# Patient Record
Sex: MALE | Race: WHITE | Employment: OTHER | URBAN - METROPOLITAN AREA
[De-identification: names, ages, dates, MRNs, and addresses within clinical notes are randomized per-mention and may not be internally consistent; named-entity substitution may affect disease eponyms.]

---

## 2019-02-28 ENCOUNTER — TRANSFERRED RECORDS (OUTPATIENT)
Dept: HEALTH INFORMATION MANAGEMENT | Facility: CLINIC | Age: 69
End: 2019-02-28

## 2019-04-08 ENCOUNTER — MEDICAL CORRESPONDENCE (OUTPATIENT)
Dept: HEALTH INFORMATION MANAGEMENT | Facility: CLINIC | Age: 69
End: 2019-04-08

## 2019-04-16 ENCOUNTER — TRANSFERRED RECORDS (OUTPATIENT)
Dept: HEALTH INFORMATION MANAGEMENT | Facility: CLINIC | Age: 69
End: 2019-04-16

## 2019-04-17 ENCOUNTER — APPOINTMENT (OUTPATIENT)
Dept: GENERAL RADIOLOGY | Facility: CLINIC | Age: 69
DRG: 193 | End: 2019-04-17
Attending: NURSE PRACTITIONER
Payer: COMMERCIAL

## 2019-04-17 ENCOUNTER — HOSPITAL ENCOUNTER (INPATIENT)
Facility: CLINIC | Age: 69
LOS: 1 days | Discharge: ANOTHER HOSPITAL | DRG: 193 | End: 2019-04-18
Attending: INTERNAL MEDICINE | Admitting: INTERNAL MEDICINE
Payer: COMMERCIAL

## 2019-04-17 PROBLEM — J96.00 ACUTE RESPIRATORY FAILURE (H): Status: ACTIVE | Noted: 2019-04-17

## 2019-04-17 LAB
ANION GAP SERPL CALCULATED.3IONS-SCNC: 6 MMOL/L (ref 3–14)
BASE EXCESS BLDA CALC-SCNC: 4.6 MMOL/L
BUN SERPL-MCNC: 27 MG/DL (ref 7–30)
CALCIUM SERPL-MCNC: 8.3 MG/DL (ref 8.5–10.1)
CHLORIDE SERPL-SCNC: 106 MMOL/L (ref 94–109)
CO2 SERPL-SCNC: 27 MMOL/L (ref 20–32)
CREAT SERPL-MCNC: 0.51 MG/DL (ref 0.66–1.25)
ERYTHROCYTE [DISTWIDTH] IN BLOOD BY AUTOMATED COUNT: 20.7 % (ref 10–15)
GFR SERPL CREATININE-BSD FRML MDRD: >90 ML/MIN/{1.73_M2}
GLUCOSE BLDC GLUCOMTR-MCNC: 117 MG/DL (ref 70–99)
GLUCOSE BLDC GLUCOMTR-MCNC: 118 MG/DL (ref 70–99)
GLUCOSE BLDC GLUCOMTR-MCNC: 122 MG/DL (ref 70–99)
GLUCOSE BLDC GLUCOMTR-MCNC: 94 MG/DL (ref 70–99)
GLUCOSE SERPL-MCNC: 131 MG/DL (ref 70–99)
HBA1C MFR BLD: 7.4 % (ref 0–5.6)
HCO3 BLD-SCNC: 30 MMOL/L (ref 21–28)
HCT VFR BLD AUTO: 30.6 % (ref 40–53)
HGB BLD-MCNC: 9 G/DL (ref 13.3–17.7)
INR PPP: 3.02 (ref 0.86–1.14)
LACTATE BLD-SCNC: 1 MMOL/L (ref 0.7–2)
MCH RBC QN AUTO: 21.4 PG (ref 26.5–33)
MCHC RBC AUTO-ENTMCNC: 29.4 G/DL (ref 31.5–36.5)
MCV RBC AUTO: 73 FL (ref 78–100)
O2/TOTAL GAS SETTING VFR VENT: 30 %
OXYHGB MFR BLD: 89 % (ref 92–100)
PCO2 BLD: 50 MM HG (ref 35–45)
PH BLD: 7.39 PH (ref 7.35–7.45)
PLATELET # BLD AUTO: 258 10E9/L (ref 150–450)
PO2 BLD: 62 MM HG (ref 80–105)
POTASSIUM SERPL-SCNC: 3.8 MMOL/L (ref 3.4–5.3)
RBC # BLD AUTO: 4.21 10E12/L (ref 4.4–5.9)
SODIUM SERPL-SCNC: 139 MMOL/L (ref 133–144)
WBC # BLD AUTO: 8.3 10E9/L (ref 4–11)

## 2019-04-17 PROCEDURE — G0463 HOSPITAL OUTPT CLINIC VISIT: HCPCS

## 2019-04-17 PROCEDURE — 94660 CPAP INITIATION&MGMT: CPT

## 2019-04-17 PROCEDURE — 00000146 ZZHCL STATISTIC GLUCOSE BY METER IP

## 2019-04-17 PROCEDURE — 94640 AIRWAY INHALATION TREATMENT: CPT

## 2019-04-17 PROCEDURE — 25000128 H RX IP 250 OP 636: Performed by: INTERNAL MEDICINE

## 2019-04-17 PROCEDURE — 25800030 ZZH RX IP 258 OP 636: Performed by: INTERNAL MEDICINE

## 2019-04-17 PROCEDURE — 40000275 ZZH STATISTIC RCP TIME EA 10 MIN

## 2019-04-17 PROCEDURE — 99207 ZZC CDG-HISTORY COMP: MEETS EXP. PROBLEM FOCUSED-DOWN CODED LACK OF ROS: CPT | Performed by: INTERNAL MEDICINE

## 2019-04-17 PROCEDURE — 99221 1ST HOSP IP/OBS SF/LOW 40: CPT | Mod: AI | Performed by: INTERNAL MEDICINE

## 2019-04-17 PROCEDURE — 25000132 ZZH RX MED GY IP 250 OP 250 PS 637: Performed by: INTERNAL MEDICINE

## 2019-04-17 PROCEDURE — 36415 COLL VENOUS BLD VENIPUNCTURE: CPT | Performed by: INTERNAL MEDICINE

## 2019-04-17 PROCEDURE — 85610 PROTHROMBIN TIME: CPT | Performed by: INTERNAL MEDICINE

## 2019-04-17 PROCEDURE — 71045 X-RAY EXAM CHEST 1 VIEW: CPT

## 2019-04-17 PROCEDURE — 82805 BLOOD GASES W/O2 SATURATION: CPT | Performed by: INTERNAL MEDICINE

## 2019-04-17 PROCEDURE — 80048 BASIC METABOLIC PNL TOTAL CA: CPT | Performed by: INTERNAL MEDICINE

## 2019-04-17 PROCEDURE — 27210437 ZZH NUTRITION PRODUCT SEMIELEM INTERMED LITER

## 2019-04-17 PROCEDURE — 25000125 ZZHC RX 250: Performed by: INTERNAL MEDICINE

## 2019-04-17 PROCEDURE — 83605 ASSAY OF LACTIC ACID: CPT | Performed by: INTERNAL MEDICINE

## 2019-04-17 PROCEDURE — 83036 HEMOGLOBIN GLYCOSYLATED A1C: CPT | Performed by: INTERNAL MEDICINE

## 2019-04-17 PROCEDURE — 85027 COMPLETE CBC AUTOMATED: CPT | Performed by: INTERNAL MEDICINE

## 2019-04-17 PROCEDURE — 21000001 ZZH R&B HEART CARE

## 2019-04-17 PROCEDURE — 94640 AIRWAY INHALATION TREATMENT: CPT | Mod: 76

## 2019-04-17 PROCEDURE — 25000131 ZZH RX MED GY IP 250 OP 636 PS 637: Performed by: INTERNAL MEDICINE

## 2019-04-17 PROCEDURE — 25000132 ZZH RX MED GY IP 250 OP 250 PS 637

## 2019-04-17 RX ORDER — ALBUTEROL SULFATE 90 UG/1
2 AEROSOL, METERED RESPIRATORY (INHALATION) EVERY 4 HOURS PRN
COMMUNITY

## 2019-04-17 RX ORDER — AMOXICILLIN 500 MG/1
500 CAPSULE ORAL 2 TIMES DAILY
Status: ON HOLD | COMMUNITY
End: 2019-04-18

## 2019-04-17 RX ORDER — BUMETANIDE 2 MG/1
2 TABLET ORAL 2 TIMES DAILY
COMMUNITY

## 2019-04-17 RX ORDER — DOCUSATE SODIUM 100 MG/1
100 CAPSULE, LIQUID FILLED ORAL 2 TIMES DAILY
COMMUNITY

## 2019-04-17 RX ORDER — IPRATROPIUM BROMIDE AND ALBUTEROL SULFATE 2.5; .5 MG/3ML; MG/3ML
3 SOLUTION RESPIRATORY (INHALATION)
Status: DISCONTINUED | OUTPATIENT
Start: 2019-04-17 | End: 2019-04-18 | Stop reason: HOSPADM

## 2019-04-17 RX ORDER — ONDANSETRON 2 MG/ML
4 INJECTION INTRAMUSCULAR; INTRAVENOUS EVERY 6 HOURS PRN
Status: DISCONTINUED | OUTPATIENT
Start: 2019-04-17 | End: 2019-04-18 | Stop reason: HOSPADM

## 2019-04-17 RX ORDER — ASCORBIC ACID 500 MG
500 TABLET ORAL DAILY
Status: DISCONTINUED | OUTPATIENT
Start: 2019-04-17 | End: 2019-04-18 | Stop reason: HOSPADM

## 2019-04-17 RX ORDER — CETIRIZINE HYDROCHLORIDE 10 MG/1
10 TABLET ORAL DAILY
COMMUNITY

## 2019-04-17 RX ORDER — NICOTINE POLACRILEX 4 MG
15-30 LOZENGE BUCCAL
Status: DISCONTINUED | OUTPATIENT
Start: 2019-04-17 | End: 2019-04-18 | Stop reason: HOSPADM

## 2019-04-17 RX ORDER — PIPERACILLIN SODIUM, TAZOBACTAM SODIUM 4; .5 G/20ML; G/20ML
4.5 INJECTION, POWDER, LYOPHILIZED, FOR SOLUTION INTRAVENOUS EVERY 6 HOURS
Status: DISCONTINUED | OUTPATIENT
Start: 2019-04-17 | End: 2019-04-18 | Stop reason: HOSPADM

## 2019-04-17 RX ORDER — ONDANSETRON 4 MG/1
4 TABLET, ORALLY DISINTEGRATING ORAL EVERY 6 HOURS PRN
Status: DISCONTINUED | OUTPATIENT
Start: 2019-04-17 | End: 2019-04-18 | Stop reason: HOSPADM

## 2019-04-17 RX ORDER — SENNOSIDES A AND B 8.6 MG/1
1 TABLET, FILM COATED ORAL AT BEDTIME
Status: ON HOLD | COMMUNITY
End: 2019-04-18

## 2019-04-17 RX ORDER — PIPERACILLIN SODIUM, TAZOBACTAM SODIUM 4; .5 G/20ML; G/20ML
4.5 INJECTION, POWDER, LYOPHILIZED, FOR SOLUTION INTRAVENOUS ONCE
Status: COMPLETED | OUTPATIENT
Start: 2019-04-17 | End: 2019-04-17

## 2019-04-17 RX ORDER — CETIRIZINE HYDROCHLORIDE 10 MG/1
10 TABLET ORAL AT BEDTIME
Status: DISCONTINUED | OUTPATIENT
Start: 2019-04-17 | End: 2019-04-18 | Stop reason: HOSPADM

## 2019-04-17 RX ORDER — GUAIFENESIN 600 MG/1
600 TABLET, EXTENDED RELEASE ORAL 2 TIMES DAILY PRN
Status: DISCONTINUED | OUTPATIENT
Start: 2019-04-17 | End: 2019-04-18 | Stop reason: HOSPADM

## 2019-04-17 RX ORDER — ACETAMINOPHEN 325 MG/1
650 TABLET ORAL EVERY 4 HOURS PRN
Status: DISCONTINUED | OUTPATIENT
Start: 2019-04-17 | End: 2019-04-18 | Stop reason: HOSPADM

## 2019-04-17 RX ORDER — AMINO AC/PROTEIN HYDR/WHEY PRO 10G-100/30
2 LIQUID (ML) ORAL 2 TIMES DAILY
Status: DISCONTINUED | OUTPATIENT
Start: 2019-04-17 | End: 2019-04-18 | Stop reason: HOSPADM

## 2019-04-17 RX ORDER — AMLODIPINE BESYLATE 5 MG/1
5 TABLET ORAL DAILY
COMMUNITY

## 2019-04-17 RX ORDER — FERROUS SULFATE 325(65) MG
325 TABLET ORAL 2 TIMES DAILY WITH MEALS
Status: DISCONTINUED | OUTPATIENT
Start: 2019-04-17 | End: 2019-04-18 | Stop reason: HOSPADM

## 2019-04-17 RX ORDER — FERROUS SULFATE 325(65) MG
325 TABLET ORAL
COMMUNITY

## 2019-04-17 RX ORDER — ALBUTEROL SULFATE 0.83 MG/ML
2.5 SOLUTION RESPIRATORY (INHALATION) EVERY 4 HOURS PRN
COMMUNITY

## 2019-04-17 RX ORDER — ALBUTEROL SULFATE 5 MG/ML
2.5 SOLUTION RESPIRATORY (INHALATION)
Status: DISCONTINUED | OUTPATIENT
Start: 2019-04-18 | End: 2019-04-18 | Stop reason: HOSPADM

## 2019-04-17 RX ORDER — POTASSIUM CHLORIDE 1.5 G/1.58G
20 POWDER, FOR SOLUTION ORAL 3 TIMES DAILY
Status: DISCONTINUED | OUTPATIENT
Start: 2019-04-17 | End: 2019-04-18 | Stop reason: HOSPADM

## 2019-04-17 RX ORDER — POTASSIUM CHLORIDE 1500 MG/1
20 TABLET, EXTENDED RELEASE ORAL 2 TIMES DAILY
COMMUNITY

## 2019-04-17 RX ORDER — LIDOCAINE 40 MG/G
CREAM TOPICAL
Status: DISCONTINUED | OUTPATIENT
Start: 2019-04-17 | End: 2019-04-18 | Stop reason: HOSPADM

## 2019-04-17 RX ORDER — POLYETHYLENE GLYCOL 3350 17 G/17G
1 POWDER, FOR SOLUTION ORAL DAILY
COMMUNITY

## 2019-04-17 RX ORDER — FLUTICASONE PROPIONATE 50 MCG
2 SPRAY, SUSPENSION (ML) NASAL AT BEDTIME
COMMUNITY

## 2019-04-17 RX ORDER — SENNOSIDES A AND B 8.6 MG/1
2 TABLET, FILM COATED ORAL DAILY PRN
COMMUNITY

## 2019-04-17 RX ORDER — SODIUM CHLORIDE, SODIUM LACTATE, POTASSIUM CHLORIDE, CALCIUM CHLORIDE 600; 310; 30; 20 MG/100ML; MG/100ML; MG/100ML; MG/100ML
INJECTION, SOLUTION INTRAVENOUS CONTINUOUS
Status: DISCONTINUED | OUTPATIENT
Start: 2019-04-17 | End: 2019-04-17

## 2019-04-17 RX ORDER — NITROGLYCERIN 0.4 MG/1
0.4 TABLET SUBLINGUAL EVERY 5 MIN PRN
Status: DISCONTINUED | OUTPATIENT
Start: 2019-04-17 | End: 2019-04-18 | Stop reason: HOSPADM

## 2019-04-17 RX ORDER — NALOXONE HYDROCHLORIDE 0.4 MG/ML
.1-.4 INJECTION, SOLUTION INTRAMUSCULAR; INTRAVENOUS; SUBCUTANEOUS
Status: DISCONTINUED | OUTPATIENT
Start: 2019-04-17 | End: 2019-04-18 | Stop reason: HOSPADM

## 2019-04-17 RX ORDER — WARFARIN SODIUM 7.5 MG/1
7.5 TABLET ORAL
Status: COMPLETED | OUTPATIENT
Start: 2019-04-17 | End: 2019-04-17

## 2019-04-17 RX ORDER — SENNOSIDES 8.6 MG
1 TABLET ORAL
Status: DISCONTINUED | OUTPATIENT
Start: 2019-04-17 | End: 2019-04-18 | Stop reason: HOSPADM

## 2019-04-17 RX ORDER — FLUTICASONE PROPIONATE 50 MCG
2 SPRAY, SUSPENSION (ML) NASAL DAILY
Status: DISCONTINUED | OUTPATIENT
Start: 2019-04-17 | End: 2019-04-18 | Stop reason: HOSPADM

## 2019-04-17 RX ORDER — GABAPENTIN 300 MG/1
900 CAPSULE ORAL 2 TIMES DAILY
COMMUNITY

## 2019-04-17 RX ORDER — DEXTROSE MONOHYDRATE 25 G/50ML
25-50 INJECTION, SOLUTION INTRAVENOUS
Status: DISCONTINUED | OUTPATIENT
Start: 2019-04-17 | End: 2019-04-18 | Stop reason: HOSPADM

## 2019-04-17 RX ORDER — DOCUSATE SODIUM 100 MG/1
100 CAPSULE, LIQUID FILLED ORAL 2 TIMES DAILY
Status: DISCONTINUED | OUTPATIENT
Start: 2019-04-17 | End: 2019-04-18 | Stop reason: HOSPADM

## 2019-04-17 RX ORDER — BUMETANIDE 1 MG/1
2 TABLET ORAL
Status: DISCONTINUED | OUTPATIENT
Start: 2019-04-17 | End: 2019-04-18 | Stop reason: HOSPADM

## 2019-04-17 RX ORDER — GABAPENTIN 300 MG/1
900 CAPSULE ORAL 2 TIMES DAILY
Status: DISCONTINUED | OUTPATIENT
Start: 2019-04-17 | End: 2019-04-18 | Stop reason: HOSPADM

## 2019-04-17 RX ORDER — POLYETHYLENE GLYCOL 3350 17 G/17G
17 POWDER, FOR SOLUTION ORAL DAILY
Status: DISCONTINUED | OUTPATIENT
Start: 2019-04-17 | End: 2019-04-18 | Stop reason: HOSPADM

## 2019-04-17 RX ORDER — AMLODIPINE BESYLATE 5 MG/1
5 TABLET ORAL DAILY
Status: DISCONTINUED | OUTPATIENT
Start: 2019-04-17 | End: 2019-04-18 | Stop reason: HOSPADM

## 2019-04-17 RX ORDER — PIPERACILLIN SODIUM, TAZOBACTAM SODIUM 3; .375 G/15ML; G/15ML
3.38 INJECTION, POWDER, LYOPHILIZED, FOR SOLUTION INTRAVENOUS EVERY 6 HOURS
Status: DISCONTINUED | OUTPATIENT
Start: 2019-04-17 | End: 2019-04-17

## 2019-04-17 RX ORDER — ALBUTEROL SULFATE 0.83 MG/ML
2.5 SOLUTION RESPIRATORY (INHALATION) 2 TIMES DAILY
COMMUNITY

## 2019-04-17 RX ADMIN — BUMETANIDE 2 MG: 1 TABLET ORAL at 21:32

## 2019-04-17 RX ADMIN — WARFARIN SODIUM 7.5 MG: 7.5 TABLET ORAL at 18:08

## 2019-04-17 RX ADMIN — IPRATROPIUM BROMIDE AND ALBUTEROL SULFATE 3 ML: .5; 3 SOLUTION RESPIRATORY (INHALATION) at 11:32

## 2019-04-17 RX ADMIN — FLUTICASONE PROPIONATE 2 SPRAY: 50 SPRAY, METERED NASAL at 21:29

## 2019-04-17 RX ADMIN — Medication 2 PACKET: at 21:43

## 2019-04-17 RX ADMIN — IPRATROPIUM BROMIDE AND ALBUTEROL SULFATE 3 ML: .5; 3 SOLUTION RESPIRATORY (INHALATION) at 19:52

## 2019-04-17 RX ADMIN — GABAPENTIN 900 MG: 300 CAPSULE ORAL at 21:32

## 2019-04-17 RX ADMIN — IPRATROPIUM BROMIDE AND ALBUTEROL SULFATE 3 ML: .5; 3 SOLUTION RESPIRATORY (INHALATION) at 07:57

## 2019-04-17 RX ADMIN — BUMETANIDE 2 MG: 1 TABLET ORAL at 12:21

## 2019-04-17 RX ADMIN — INSULIN GLARGINE 30 UNITS: 100 INJECTION, SOLUTION SUBCUTANEOUS at 11:20

## 2019-04-17 RX ADMIN — IPRATROPIUM BROMIDE AND ALBUTEROL SULFATE 3 ML: .5; 3 SOLUTION RESPIRATORY (INHALATION) at 15:31

## 2019-04-17 RX ADMIN — GABAPENTIN 900 MG: 300 CAPSULE ORAL at 15:08

## 2019-04-17 RX ADMIN — POTASSIUM CHLORIDE 20 MEQ: 1.5 POWDER, FOR SOLUTION ORAL at 21:31

## 2019-04-17 RX ADMIN — GUAIFENESIN 600 MG: 600 TABLET, EXTENDED RELEASE ORAL at 21:37

## 2019-04-17 RX ADMIN — SODIUM CHLORIDE, POTASSIUM CHLORIDE, SODIUM LACTATE AND CALCIUM CHLORIDE 75 ML/HR: 600; 310; 30; 20 INJECTION, SOLUTION INTRAVENOUS at 10:33

## 2019-04-17 RX ADMIN — CETIRIZINE HYDROCHLORIDE 10 MG: 10 TABLET, FILM COATED ORAL at 21:37

## 2019-04-17 RX ADMIN — OXYCODONE HYDROCHLORIDE AND ACETAMINOPHEN 500 MG: 500 TABLET ORAL at 15:07

## 2019-04-17 RX ADMIN — SODIUM CHLORIDE, POTASSIUM CHLORIDE, SODIUM LACTATE AND CALCIUM CHLORIDE: 600; 310; 30; 20 INJECTION, SOLUTION INTRAVENOUS at 02:48

## 2019-04-17 RX ADMIN — DOCUSATE SODIUM 100 MG: 100 CAPSULE, LIQUID FILLED ORAL at 15:08

## 2019-04-17 RX ADMIN — AMLODIPINE BESYLATE 5 MG: 5 TABLET ORAL at 15:08

## 2019-04-17 RX ADMIN — PIPERACILLIN SODIUM,TAZOBACTAM SODIUM 4.5 G: 4; .5 INJECTION, POWDER, FOR SOLUTION INTRAVENOUS at 11:01

## 2019-04-17 RX ADMIN — Medication 2 PACKET: at 11:20

## 2019-04-17 RX ADMIN — VANCOMYCIN HYDROCHLORIDE 2000 MG: 5 INJECTION, POWDER, LYOPHILIZED, FOR SOLUTION INTRAVENOUS at 14:26

## 2019-04-17 RX ADMIN — FERROUS SULFATE TAB 325 MG (65 MG ELEMENTAL FE) 325 MG: 325 (65 FE) TAB at 18:08

## 2019-04-17 RX ADMIN — PIPERACILLIN SODIUM,TAZOBACTAM SODIUM 4.5 G: 4; .5 INJECTION, POWDER, FOR SOLUTION INTRAVENOUS at 17:06

## 2019-04-17 RX ADMIN — DOCUSATE SODIUM 100 MG: 100 CAPSULE, LIQUID FILLED ORAL at 21:31

## 2019-04-17 RX ADMIN — GUAIFENESIN 600 MG: 600 TABLET, EXTENDED RELEASE ORAL at 21:32

## 2019-04-17 RX ADMIN — VITAMIN D 3000 UNITS: 25 TAB ORAL at 15:09

## 2019-04-17 RX ADMIN — OMEPRAZOLE 20 MG: 20 CAPSULE, DELAYED RELEASE ORAL at 17:07

## 2019-04-17 RX ADMIN — POTASSIUM CHLORIDE 20 MEQ: 1.5 POWDER, FOR SOLUTION ORAL at 15:08

## 2019-04-17 RX ADMIN — PIPERACILLIN SODIUM,TAZOBACTAM SODIUM 4.5 G: 4; .5 INJECTION, POWDER, FOR SOLUTION INTRAVENOUS at 04:35

## 2019-04-17 RX ADMIN — Medication 2 SPRAY: at 21:29

## 2019-04-17 ASSESSMENT — ACTIVITIES OF DAILY LIVING (ADL)
ADLS_ACUITY_SCORE: 25
ADLS_ACUITY_SCORE: 31
ADLS_ACUITY_SCORE: 31
ADLS_ACUITY_SCORE: 29
ADLS_ACUITY_SCORE: 29

## 2019-04-17 ASSESSMENT — MIFFLIN-ST. JEOR: SCORE: 2297

## 2019-04-17 NOTE — PROGRESS NOTES
Update note:     Patient seen and examined.  Currently on pressure support but appears comfortable.  At this time he expresses [facility]  He states he was supposed to be directly admitted there tomorrow for planned surgery next Monday to help with flap closure and his chronic wounds.       [facility] to [facility].  They have no medical beds and are looking in to see if they could take the patient tomorrow    In the meant time we will continue the current plan  - Antibiotics with Vancomycin and Zosyn   - Titrate off pressure support as tolerated   - WOC following and appreciate their assistance  - Discontinued IVF     Karri Hager DO   Hospitalist

## 2019-04-17 NOTE — H&P
Admitted:     04/17/2019      CHIEF COMPLAINT:  Cough, dyspnea.      HISTORY OF PRESENT ILLNESS:  Mr. Anton Crain is a 68-year-old very complicated medically patient of the [facility].  He presents to [facility] due to lack of beds.  He went into clinic on 04/16/2019, complaining of dyspnea and cough and his home health aide also had some cold and sickness exposure.  He went to see his primary care provider who noted some crackles at his bases bilaterally and wheezing and oxygen saturation in the mid 80s.  [facility]  In the Emergency Department, the patient was diagnosed as having a retrocardiac infiltrate.  He was started on vancomycin and Zosyn.  He has a chronic wound behind his left thigh, for which he has a wound VAC and is on chronic suppressive antibiotics including amoxicillin with Augmentin.  The patient is also diabetic and is on insulin.  He lives independently in a house with his wife.  He has a T6-T7 spinal cord injury.  He has also had complications with this including a sacral wound, as well as a neurogenic bladder for which he has a suprapubic catheter.  He has a G-tube, where he gets nutritional supplements.  He also has a colostomy as well.  He is on a moderate carbohydrate diet that is not modified.  The patient after transfer here was short of breath and requiring assistance so the patient has been started on BiPAP, type support.  He is full code.  He is mentating reasonably well and able to have a fairly normal conversation.      PAST MEDICAL HISTORY:   1.  Hypertension.   2.  Heterozygous factor V Leiden mutation.     3.  Spinal cord injury.   4.  Neurogenic bladder as a result of the spinal cord injury.   5.  History of ureteral calculus.     6.  History of congestive heart failure.   7.  Cervicalgia.   8.  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   9.  Rotator cuff impingement syndrome.   10.  GERD.   11.  History of  cholecystectomy.     12.  History of kidney malignancy.     13.  Anemia.   14.  Morbid obesity.     15.  Open wound of the foot.   16.  History of diabetic foot complications.     17.  Obstructive sleep apnea.     18.  History of DVT.  The patient is on chronic anticoagulation.      PAST SURGICAL HISTORY:   1.  Multiple debridements for his foot wound.   2.  History of G-tube placement.   3.  History of suprapubic catheter placement.   4.  History of colostomy.   5.  History of cholecystectomy.      ALLERGIES:  TERAZOSIN, COLCHICINE AND CEFAZOLIN.      CURRENT MEDICATIONS:   1.  Albuterol metered dose inhaler 2 puffs every 4 hours as needed.   2.  Albuterol nebs twice a day and every 4 hours as needed.   3.  Amlodipine 5 mg once day.   6.  Amoxicillin 500 mg 1 capsule twice a day for bone infection.   7.  Augmentin 875/125 one tablet twice a day in addition to the amoxicillin.    8.  Vitamin C 500 mg once day.   9.  Bumex 2 mg twice a day.   10.  Cetirizine 10 mg once a day.   11.  Vitamin D 2000 units once a day.   12.  Docusate 100 mg twice a day.   13.  Three cans of Impact tube feeds nightly.   14.  ProSource 2 packets in the morning and 2 packets in the evening.   15.  Ferrous sulfate 325 mg twice daily.   16.  Fluticasone 50 mcg 2 sprays each naris, 2 evening.     17.  Gabapentin 900 mg twice a day.   18.  Nitroglycerin 2% ointment apply paste as needed for hypertension or chest pain.   19.  Glucerna shake 1 drink daily with wound healing.   20.   Olodaterol/tiotropium p.m. inhaler 2 puffs every day.   21.  Omeprazole 20 mg twice a day.   22.  MiraLAX 17 grams every day for neurogenic bowel.   23.  Potassium chloride 20 mEq 3 times a day.   24.  Senna 1 tablet at bedtime and 2 tablets as needed.   25.  Sodium chloride nasal spray 2 sprays each naris 4 times daily.   26.  Warfarin per nomogram.   27.  Lantus 47 units q.a.m.    FAMILY HISTORY:  Reviewed and noncontributory.      SOCIAL HISTORY:  .  Full  code.  No alcohol, no tobacco.      REVIEW OF SYSTEMS:  Ten-point systems reviewed.  Positive for cough, shortness of breath, sore throat, weakness, hypoxemia.  Otherwise, 10-point review of systems was reviewed and otherwise unremarkable.        PHYSICAL EXAMINATION:   VITAL SIGNS:  Temperature 98.6, heart rate 92, respiratory rate 18, blood pressure 122/52, saturations 93% on AVAPS.   GENERAL:  The patient is a 62-year-old heavyset  gentleman with a face mask.  He is able to converse.   HEENT:  Head is atraumatic.  Sclerae are anicteric.  Pupils equal.  Oropharynx shows mucous membranes to be dry, no pharyngeal exudates.   NECK:  Veins are difficult to assess in the setting of BiPAP.   PULMONARY:  He has bilateral coarse breath sounds and rhonchi.   CARDIOVASCULAR:  S1, S2, regular rate.   ABDOMEN:  Obese.  He has got a G-tube in place.  He has a colostomy with output.  Abdomen is soft.  Bowel sounds are normoactive.     MUSCULOSKELETAL:  He has a wound VAC behind his left thigh with a wound VAC in place.  He has a coccygeal stage IV ulcer.  He has +1 pitting edema of his lower extremity.   NEUROLOGIC:  Below his umbilicus he is paralyzed.  His upper extremities are normal.  Cranial nerves appear to be grossly intact.   SKIN:  Warm, dry, well perfused.   PSYCHIATRIC:  The patient's mood and affect are stable.      LABORATORY AND IMAGING STUDIES:  Chest x-ray shows left basilar retrocardiac opacity, likely atelectasis, small pleural effusion, mild right basilar atelectasis.  Troponin is negative.  ProBNP is not elevated.  Albumin is low.  Influenza is negative.  White count is 7.6, hemoglobin 9.3, platelets of 284.  Sodium 141, potassium 4, calcium 8.8.      ASSESSMENT:  Anton Crain is a 68-year-old gentleman with a complicated medical history including T6-T7 spinal cord injury causing complete paralysis of his lower extremity with additional complication of a neurogenic bladder and requiring a  G-tube placement, as well as colostomy.  He has chronic wounds in his sacrum as well as behind his left thigh, for which he has a wound VAC.  He is also diabetic.  Admitted with what appears to be pneumonia, possibly community-acquired versus healthcare-acquired being admitted for further treatment with acute respiratory failure.      PLAN:   1.  Acute respiratory failure with a retrocardiac infiltrate.  The patient has been on chronic suppressive antibiotic treatment with Augmentin and amoxicillin.  The patient was started on vancomycin, noting he has history of MRSA in his urine.  The patient was started on Zosyn in the setting of possible pseudomonas involvement as well.  As noted, the patient also has chronic wounds which he is on amoxicillin/Augmentin, combination.  The patient will be on BiPAP/AVAPS support overnight.  The patient will be going to the intermediate care unit.  We will hopefully wean him off of that.   2.  History of congestive heart failure.  The patient has small pleural effusion.  We will continue the patient on his diuretics with Bumex.  We will cautiously give him IV fluids, as long as he is n.p.o.   3.  Diabetes.  The patient will be on Lantus 47 units in the morning, moderate carbohydrate diet and Accu-Cheks 4 times a day with sliding scale insulin.   4.  Chronic malnutrition instead of uncomplicated multiple comorbidities.  The patient is on 3 cans of Impact nutritional supplementation, as well as ProSource.  We will have our dietitian see the patient and resume all of his supplements.   5.  Chronic wounds of his left thigh and sacrum.  We will have wound care nurse see the patient.  His wound VAC battery is running out as he was not planning on being admitted.  If this cannot be continued with respect to his wound VAC unit, then he would need to go to a wet dressing.  We will have our wound care nurse help assess that as well as the sacral wound.   6.  History of factor V Leiden  heterozygous with a complication of DVT.  The patient will continue the warfarin with pharmacy dosing.   7.  Gastroesophageal reflux disease:  We will continue the patient on Prilosec.   8.  Peripheral neuropathy.  We will keep the patient on gabapentin.   9.  Allergies:  We will continue the patient on cetirizine and Flonase.   10.  Anemia.  We will continue the patient on iron and vitamin C.   11.  Chronic left posterior thigh wound and sacral wound.  The patient is on combination of Augmentin as well as augmented dosing of additional amoxicillin.  We will hold that medication while the patient is on vancomycin and Zosyn.   12.  Hypertension.  We will continue the patient on his amlodipine.   13.  Deep venous thrombosis prophylaxis.  The patient is already anticoagulated.      CODE STATUS:  Full.         LORI VIERA MD             D: 2019   T: 2019   MT: CURTIS      Name:     GINO LOPEZ   MRN:      [id number]        Account:      [id number]   :      1950        Admitted:     2019                   Document: [id number]       cc: [facility]

## 2019-04-17 NOTE — PHARMACY-ANTICOAGULATION SERVICE
Clinical Pharmacy - Warfarin Dosing Consult     Pharmacy has been consulted to manage this patient s warfarin therapy.  Indication: DVT/ PE Treatment  Therapy Goal: INR 2-3  Warfarin Prior to Admission: Yes  Warfarin PTA Regimen: 10 mg Monday, Wednesday and Friday, 7.5 mg on all other days   Dose Comments: [facility]farin dose is unstable     INR   Date Value Ref Range Status   04/17/2019 3.02 (H) 0.86 - 1.14 Final       Recommend warfarin 7.5 mg today.  Pharmacy will monitor Anton Nealen daily and order warfarin doses to achieve specified goal.      Please contact pharmacy as soon as possible if the warfarin needs to be held for a procedure or if the warfarin goals change.        Marlon BennettD

## 2019-04-17 NOTE — PROGRESS NOTES
D:  First assessment of pt this morning and pt has had his [employer] VAC dressing in place with no suction, pump off.  He has been sitting up right in bed, not turned and heels not elevated on a standard Atmos Air Mattress.   O2 satting around 88% and pt saying his is SOB  I went to supply and got VAC cannister for a VAC pump that was sitting outside the room, put cannister into pump and started suction, set to 150- per pt is his normal setting.  Talked with Marlon in supply room to say I needed a stat bariatric low airloss, he brought one up within 15minutes.  I returned to the room a couple more times, pt was struggling to breath, called in the charge nurse for support, pt said he normally sats around 92-94% on his home biPAP on room air.  At one point an RN turned up the oxygen on the wall w his home device.    P:  At this time am not going to change the dressing.  Pt is too fragile to be messing with his wound and doing a dressing change.  WOC will return tomorrow for dressing change

## 2019-04-17 NOTE — CODE/RAPID RESPONSE
Brief MARGARETTE note  4/17/2019  1257    A rapid response was called for increased work of breathing.  The patient is on his home AVAPS, with increased oxygen and being piped through (now 10L). He has a complex history of paraplegia, CHF, and is admitted with PNA. ABG shows hypoxemia from 0300. He has poor respiratory effort, but appears mostly comfortable on his home AVAPS. Lungs are coarse and congested bilaterally, abd soft non tender.     P:  --will plan repeat ABG, and CXR    The hospitalist primary rounder will round of the patient soon to re-evaluate.      MANDY Flores [facility]  Hospitalist Service  House Officer  Pager: [phone] (1a - 6p)

## 2019-04-17 NOTE — PLAN OF CARE
VSS. Paraplegic, A&O x4. Lift assist. Suprapubic cath, G-tube, and Colostomy. Wound vac on L thigh wound. Wound on Coccyx. On AVAPS overnight. Coarse crackles. LR @ 75. ABX intermitted. WOC consult. Continue to monitor.

## 2019-04-17 NOTE — PROGRESS NOTES
Patient on AVAPS overnight. SPO2@ 94%. Alarm volume set at 10. Gel pad on. LS wheezes. RT will continue to monitor.

## 2019-04-17 NOTE — CONSULTS
"CLINICAL NUTRITION SERVICES  -  ASSESSMENT NOTE      RECOMMENDATIONS FOR MD/PROVIDER TO ORDER:     Rec TF as per home:  Impact Peptide 1.5 @ 75 mL/hr x 10 hrs (8pm-6am) = 1125 cals, 70 gm pro, 578 mL free water, 0 gm fiber, 105gm CHO  Water flush of 60 mL before and after cycle  Prosouce - 2 packets given BID    Need MD order (\"Dietitian to Assess and ORDER TF\") before I can enter TF into Epic        Recommendations Ordered by Registered Dietitian (RD):     Will send a strawberry Boost Glucose Control at 2pm      Malnutrition:   % Weight Loss:  None noted  % Intake:  No decreased intake noted  Subcutaneous Fat Loss:  None observed  Muscle Loss:  None observed  Fluid Retention:  None noted    Malnutrition Diagnosis: Patient does not meet two of the above criteria necessary for diagnosing malnutrition         REASON FOR ASSESSMENT  Anton Crain is a 68 year old male seen by Registered Dietitian for Provider Order - Registered Dietitian to Assess and Recommend TF.  Provider is responsible for entering the TF orders      NUTRITION HISTORY  Visited with pt this morning - \"[facility]\"  [facility] that [facility] - \"they [facility]\"  Pt tells me that he had a G-tube placed a few months ago - \"trying to get my protein up in preparation for wound surgery\"  Pt is scheduled for flap surgery 4/22/19    Home schedule:  Impact Peptide 1.5 - 3 cans infused during the night.  Runs pump at 75 mL/hr for 10 hrs. This provides 1125 cals, 70 gm pro  Received Prosource (protein modular) - 2 packets in the am and 2 packets in the pm.  This provides 160 cals, 44 gm pro  Pt drinks 1 strawberry Glucerna supplement per day.  This provides 180 cals, 10 gm rpo  TOTAL: 1465 cals, 124 gm pro    Pt also eats 3 meals per day  Breakfast is often eggs, toast, sausage/cardoza  Lunch varies -\"whatever is in the frig\", sandwich, chicken  Dinner - meat, veggie, potato      At home, pt also takes 500 mg Vit C for " "wound healing      CURRENT NUTRITION ORDERS  Diet Order:     Moderate CHO    Pt just came off Bipap  Notes that he isn't hungry right now - \"might order something to eat in a bit\"      NUTRITION FOCUSED PHYSICAL ASSESSMENT (NFPA)  Completed:  Yes Visual assessment only         Observed:    No nutrition-related physical findings observed    Obtained from Chart/Interdisciplinary Team:  G-tube  He has a wound VAC behind his left thigh.    He has a coccygeal stage IV ulcer.    He has +1 pitting edema of his lower extremity.       ANTHROPOMETRICS  Height: 6' 0\"  Weight:(4/17) 147.9 kg / 326 lbs .97 oz  Body mass index is 44.22 kg/m .  Weight Status:  Obesity Grade III BMI >40  IBW: 80.9 kg  % IBW: 183%  Weight History: Pt reports wt has been stable at ~325#    LABS  Labs reviewed    MEDICATIONS  IVF (LR) at 75 mL/hr  500 mg ascorbic acid once daily  Insulin  Bumex  Colace  Miralax    ASSESSED NUTRITION NEEDS PER APPROVED PRACTICE GUIDELINES:    Dosing Weight 147.9 kg for energy (actual wt 4/17),  80.9 kg for protein (IBW)  Estimated Energy Needs: 1078-0575 kcals (11-14 Kcal/Kg)  Justification: obese  Estimated Protein Needs: 120-160 grams protein (1.5-2 g pro/Kg)  Justification: wound healing and obesity guidelines   Estimated Fluid Needs: 5597-0698  mL (1 mL/Kcal)  Justification: maintenance OR per provider pending fluid status    MALNUTRITION:  % Weight Loss:  None noted  % Intake:  No decreased intake noted  Subcutaneous Fat Loss:  None observed  Muscle Loss:  None observed  Fluid Retention:  None noted    Malnutrition Diagnosis: Patient does not meet two of the above criteria necessary for diagnosing malnutrition      NUTRITION DIAGNOSIS:  Increased nutrient needs (protein) related to higher demand for healing as evidenced by pt with noted wound VAC and plans for wound surgery next week      NUTRITION INTERVENTIONS  Recommendations / Nutrition Prescription  Moderate CHO    Rec TF as per home:  Impact Peptide 1.5 @ 75 " "mL/hr x 10 hrs (8pm-6am) = 1125 cals, 70 gm pro, 578 mL free water, 0 gm fiber, 105gm CHO  Water flush of 60 mL before and after cycle  Prosouce - 2 packets given BID    Will send a strawberry Boost Glucose Control at 2pm   .      Implementation  Nutrition education ---> Reviewed menu and meal ordering process  Spoke with RN regarding TF recs --> need MD order (\"Dietitian to Assess and ORDER TF\") before I can enter TF into Epic, RN to discuss with MD  Will order the Boost supplement  .      Nutrition Goals  EN to be resumed this evening  .      MONITORING AND EVALUATION:  Progress towards goals will be monitored and evaluated per protocol and Practice Guidelines                "

## 2019-04-17 NOTE — PLAN OF CARE
VSS. Monitor remains Sinus rhythm. Pt. Denies pain. Pt. Has been on AVAPS 50%, currently on 5L NC with stable O2   Sats. Wound vac to left thigh per order by WOC nurse. [facility]. Continue to monitor.

## 2019-04-18 VITALS
WEIGHT: 315 LBS | BODY MASS INDEX: 42.66 KG/M2 | HEART RATE: 96 BPM | DIASTOLIC BLOOD PRESSURE: 90 MMHG | SYSTOLIC BLOOD PRESSURE: 140 MMHG | HEIGHT: 72 IN | TEMPERATURE: 98.6 F | RESPIRATION RATE: 25 BRPM | OXYGEN SATURATION: 95 %

## 2019-04-18 LAB
GLUCOSE BLDC GLUCOMTR-MCNC: 115 MG/DL (ref 70–99)
GLUCOSE BLDC GLUCOMTR-MCNC: 116 MG/DL (ref 70–99)
INR PPP: 3.05 (ref 0.86–1.14)

## 2019-04-18 PROCEDURE — 00000146 ZZHCL STATISTIC GLUCOSE BY METER IP

## 2019-04-18 PROCEDURE — 94640 AIRWAY INHALATION TREATMENT: CPT | Mod: 76

## 2019-04-18 PROCEDURE — 25000128 H RX IP 250 OP 636: Performed by: INTERNAL MEDICINE

## 2019-04-18 PROCEDURE — 25000132 ZZH RX MED GY IP 250 OP 250 PS 637: Performed by: INTERNAL MEDICINE

## 2019-04-18 PROCEDURE — G0463 HOSPITAL OUTPT CLINIC VISIT: HCPCS

## 2019-04-18 PROCEDURE — 25000125 ZZHC RX 250: Performed by: INTERNAL MEDICINE

## 2019-04-18 PROCEDURE — 99239 HOSP IP/OBS DSCHRG MGMT >30: CPT | Performed by: HOSPITALIST

## 2019-04-18 PROCEDURE — 85610 PROTHROMBIN TIME: CPT | Performed by: INTERNAL MEDICINE

## 2019-04-18 PROCEDURE — 25000131 ZZH RX MED GY IP 250 OP 636 PS 637: Performed by: INTERNAL MEDICINE

## 2019-04-18 PROCEDURE — 40000275 ZZH STATISTIC RCP TIME EA 10 MIN

## 2019-04-18 PROCEDURE — 36415 COLL VENOUS BLD VENIPUNCTURE: CPT | Performed by: INTERNAL MEDICINE

## 2019-04-18 PROCEDURE — 25000128 H RX IP 250 OP 636: Performed by: NURSE PRACTITIONER

## 2019-04-18 PROCEDURE — 25800030 ZZH RX IP 258 OP 636: Performed by: INTERNAL MEDICINE

## 2019-04-18 PROCEDURE — 94640 AIRWAY INHALATION TREATMENT: CPT

## 2019-04-18 PROCEDURE — 97602 WOUND(S) CARE NON-SELECTIVE: CPT

## 2019-04-18 PROCEDURE — 25000125 ZZHC RX 250

## 2019-04-18 RX ORDER — LORAZEPAM 2 MG/ML
0.5 INJECTION INTRAMUSCULAR ONCE
Status: COMPLETED | OUTPATIENT
Start: 2019-04-18 | End: 2019-04-18

## 2019-04-18 RX ORDER — WARFARIN SODIUM 5 MG/1
5 TABLET ORAL
Status: DISCONTINUED | OUTPATIENT
Start: 2019-04-18 | End: 2019-04-18 | Stop reason: HOSPADM

## 2019-04-18 RX ORDER — ALBUTEROL SULFATE 0.83 MG/ML
SOLUTION RESPIRATORY (INHALATION)
Status: COMPLETED
Start: 2019-04-18 | End: 2019-04-18

## 2019-04-18 RX ADMIN — PIPERACILLIN SODIUM,TAZOBACTAM SODIUM 4.5 G: 4; .5 INJECTION, POWDER, FOR SOLUTION INTRAVENOUS at 12:54

## 2019-04-18 RX ADMIN — ACETAMINOPHEN 650 MG: 325 TABLET, FILM COATED ORAL at 02:08

## 2019-04-18 RX ADMIN — OXYCODONE HYDROCHLORIDE AND ACETAMINOPHEN 500 MG: 500 TABLET ORAL at 09:06

## 2019-04-18 RX ADMIN — POLYETHYLENE GLYCOL 3350 17 G: 17 POWDER, FOR SOLUTION ORAL at 09:06

## 2019-04-18 RX ADMIN — VANCOMYCIN HYDROCHLORIDE 2000 MG: 5 INJECTION, POWDER, LYOPHILIZED, FOR SOLUTION INTRAVENOUS at 02:06

## 2019-04-18 RX ADMIN — DOCUSATE SODIUM 100 MG: 100 CAPSULE, LIQUID FILLED ORAL at 09:06

## 2019-04-18 RX ADMIN — IPRATROPIUM BROMIDE AND ALBUTEROL SULFATE 3 ML: .5; 3 SOLUTION RESPIRATORY (INHALATION) at 11:14

## 2019-04-18 RX ADMIN — OMEPRAZOLE 20 MG: 20 CAPSULE, DELAYED RELEASE ORAL at 09:05

## 2019-04-18 RX ADMIN — AMLODIPINE BESYLATE 5 MG: 5 TABLET ORAL at 09:06

## 2019-04-18 RX ADMIN — IPRATROPIUM BROMIDE AND ALBUTEROL SULFATE 3 ML: .5; 3 SOLUTION RESPIRATORY (INHALATION) at 07:39

## 2019-04-18 RX ADMIN — PIPERACILLIN SODIUM,TAZOBACTAM SODIUM 4.5 G: 4; .5 INJECTION, POWDER, FOR SOLUTION INTRAVENOUS at 00:20

## 2019-04-18 RX ADMIN — BUMETANIDE 2 MG: 1 TABLET ORAL at 09:05

## 2019-04-18 RX ADMIN — LORAZEPAM 0.5 MG: 2 INJECTION INTRAMUSCULAR; INTRAVENOUS at 05:28

## 2019-04-18 RX ADMIN — ALBUTEROL SULFATE 2.5 MG: 2.5 SOLUTION RESPIRATORY (INHALATION) at 00:08

## 2019-04-18 RX ADMIN — POTASSIUM CHLORIDE 20 MEQ: 1.5 POWDER, FOR SOLUTION ORAL at 09:06

## 2019-04-18 RX ADMIN — FERROUS SULFATE TAB 325 MG (65 MG ELEMENTAL FE) 325 MG: 325 (65 FE) TAB at 09:06

## 2019-04-18 RX ADMIN — VANCOMYCIN HYDROCHLORIDE 2000 MG: 5 INJECTION, POWDER, LYOPHILIZED, FOR SOLUTION INTRAVENOUS at 14:23

## 2019-04-18 RX ADMIN — VITAMIN D 3000 UNITS: 25 TAB ORAL at 09:05

## 2019-04-18 RX ADMIN — GABAPENTIN 900 MG: 300 CAPSULE ORAL at 09:05

## 2019-04-18 RX ADMIN — Medication 2 PACKET: at 11:32

## 2019-04-18 RX ADMIN — PIPERACILLIN SODIUM,TAZOBACTAM SODIUM 4.5 G: 4; .5 INJECTION, POWDER, FOR SOLUTION INTRAVENOUS at 05:13

## 2019-04-18 RX ADMIN — FLUTICASONE PROPIONATE 2 SPRAY: 50 SPRAY, METERED NASAL at 09:10

## 2019-04-18 RX ADMIN — INSULIN GLARGINE 30 UNITS: 100 INJECTION, SOLUTION SUBCUTANEOUS at 09:04

## 2019-04-18 ASSESSMENT — ACTIVITIES OF DAILY LIVING (ADL)
ADLS_ACUITY_SCORE: 29

## 2019-04-18 ASSESSMENT — MIFFLIN-ST. JEOR: SCORE: 2310.33

## 2019-04-18 NOTE — PROGRESS NOTES
[facility] Nurse Inpatient Wound Assessment   -KCI VAC Dressing change      Assessment of wound(s) on pt's:  Lt posterior thigh wound with KCI VAC dressing - POA                                                                    Rt great toe wound - POA                                                                    Rt lateral gaitor area wound - POA        Data:   Patient History:     Mr. Anton Crain is a 68-year-old very complicated medically patient of the [facility].  He presents to [facility] due to lack of beds.  He went into clinic on 04/16/2019, complaining of dyspnea and cough and his home health aide also had some cold and sickness exposure.  He went to see his primary care provider who noted some crackles at his bases bilaterally and wheezing and oxygen saturation in the mid 80s.  [facility]  In the Emergency Department, the patient was diagnosed as having a retrocardiac infiltrate.  He was started on vancomycin and Zosyn.  He has a chronic wound behind his left thigh, for which he has a wound VAC and is on chronic suppressive antibiotics including amoxicillin with Augmentin.  The patient is also diabetic and is on insulin.  He lives independently in a house with his wife.  He has a T6-T7 spinal cord injury.  He has also had complications with this including a sacral wound, as well as a neurogenic bladder for which he has a suprapubic catheter.  He has a G-tube, where he gets nutritional supplements.  He also has a colostomy as well.  He is on a moderate carbohydrate diet that is not modified.  The patient after transfer here was short of breath and requiring assistance so the patient has been started on BiPAP, type support.  He is full code.  He is mentating reasonably well and able to have a fairly normal conversation.      PAST MEDICAL HISTORY:   1.  Hypertension.   2.  Heterozygous factor V Leiden  mutation.     3.  Spinal cord injury.   4.  Neurogenic bladder as a result of the spinal cord injury.   5.  History of ureteral calculus.     6.  History of congestive heart failure.   7.  Cervicalgia.   8.  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   9.  Rotator cuff impingement syndrome.   10.  GERD.   11.  History of cholecystectomy.     12.  History of kidney malignancy.     13.  Anemia.   14.  Morbid obesity.     15.  Open wound of the foot.   16.  History of diabetic foot complications.     17.  Obstructive sleep apnea.     18.  History of DVT.  The patient is on chronic anticoagulation.            Shailesh Risk Assessment  Sensory Perception: 4-->no impairment    Moisture: 4-->rarely moist   Activity: 1-->bedfast     Mobility: 2-->very limited   Nutrition: 2-->probably inadequate   Shailesh Score: 14      Positioning: with full assist of lift and 2-3    Mattress: Bariatric pulsate      Moisture Management:  SP for UIC and colostomy for FIC    Catheter secured? NA      Current Diet / Nutrition:    Moderate Consistent CHO Diet    Labs:   Recent Labs   Lab Test 04/18/19  0539  04/17/19  0543   HGB  --   --  9.0*   INR 3.05*   < >  --    WBC  --   --  8.3   A1C  --   --  7.4*    < > = values in this interval not displayed.       Wound Assessment (location):  Lt posterior thigh PI Stage 4 community acquired  Wound History: Per patient is from w/c foot rest. [facility]. Per  Pt he [facility] with [facility] to wound on Monday 4-22-19.  Pt was admitted to  for respiratory issues because [facility] had no open bed. Per pt [facility] for [facility]F dressing changes to Lt posterior thigh with VAC; Rt great toe and Rt lateral gaitor area wound      Age of wound/ surgical date: Per patient Lt posterior wound has undergone serial debridements    Date KCI VAC placed:unknown    KCI Wound VAC initiated by:  Unknown. Placed @ [facility]    Any other wound therapies tried prior to KCI VAC placed? Per patient wet->  dry  Lt posterior thigh wound: WOC photo on 4-17-19          Wound Base:     Specific Dimensions (length x width x depth, in cm) :  8.5cm x 3.0cm x 8.0cm    Wound wall and 75% wound bed: red and bleeding granular    Tunneling:  NA    Undermining: 3-4cm from 12-4 o'clock    Palpation of the wound bed:  Firm, like underlying bone under granular tissue    Slough appearance:  adherent, moist, stringy and yellow/gray from 6-8 o'clock in wound bed    Eschar appearance:  none    Periwound Skin: intact with scar tissue noted   ? Color: whitish scar tissue to blanchable pink  ? Temperature  normal     Drainage: small sero-sang    Odor: none    Pain:  Denies    Current tx: KCI wound vac with black sponge, @ 125mm/hg cont suction, changed M-W-F    #2: Rt great toe wound, community acquired  WOC photo on 4-17-19               Specific Dimensions (length x width x depth, in cm) :  1.8cm x 1.0cm x .1cm    Wound  Bed:  75% red granular / 25% yellow moist adherent slough    Tunneling:  NA    Undermining: NA    Palpation of the wound bed:  Soft    Slough appearance:  yellow moist adherent slough    Eschar appearance:  none    Periwound Skin: intact   ? Color: very faint blanchable erythema  ? Temperature  normal     Drainage: none    Odor: none    Pain:  Denies    Current tx: Per patient: Aqua cell AG and Mepilex border changed on [facility]W-F @ [facility] when VAC changed    #3:  Rt lateral Gaitor area wound:  Community acquired.         WOC photo:  4-17-19                  Specific Dimensions (length x width x depth, in cm) :  4.5cm x 3.0cm x 3cm    Wound  Bed:  50% red granular / 50% yellow moist adherent slough    Tunneling:  NA    Undermining: NA    Palpation of the wound bed:  Soft    Slough appearance:  yellow moist adherent slough    Eschar appearance:  none    Periwound Skin: intact   ? Color: very faint blanchable erythema  ? Temperature  normal     Drainage: none    Odor: none    Pain:  Denies    Current tx: Per patient: Aqua cell AG  and Mepilex border changed on M-W-F @ [facility] when VAC changed                  Intervention:     Patient's chart evaluated.      Wound: assessed    Wound Care: Wounds all cleaned with MicroKlenz                               Skin prep to all shawn-wound skin                                  Lt posterior thigh: packed with 5 x pieces of black sponge                                All secured VAC drape                                 Adaptor bridged and padded to Lt anterior thigh                                                                 Rt great toe and Rt lateral gaitor wounds:                                Aqua cell ag to wound beds                                Covered with Mepiex border    LUQ colostomy: appliance intact and pt changed this am. Denies issues and has his own supplies. Formed fecal output in closed pouch  SP cath: intact and connected to leg bag. Urine is spencer with moderate amt output  GT: Intact, no shawn-tube erythema and stabilized   Sacral PI: unable to assess. On return to pt room he had discharge  BLE:  velcro compression wraps to legs                                    Orders: pt discharged prior to orders being placed. [facility]    Supplies  It pt bedside drawer    Discussed plan of care with patient and Nursing          Assessment:    Rt posterior stage 4 PI, community acquired. No local s/s infection. [facility] to determine if wound ready for flap  Rt great toe and Rt lateral gaitor wound:  Community acquired. No local s/s infection        Plan:     Nursing to notify the Provider(s) and re-consult the WOC Nurse if wound(s) deteriorate(s) or if necessary to reevaluate the plan.    [facility] to resume Tx plan to Lt posterior Stage 4 PI; Rt great to PI and Rt lateral gaitor wound  All above wound dressing changed today prior to pts discharge.   Pt has his own Home KCI VAC but left  @ home. Battery is dead

## 2019-04-18 NOTE — PLAN OF CARE
VSS. A&O x4. Lift assist. Suprapubic cath, G-tube, and Colostomy all WDL. Wound vac on L thigh wound. Wound on Coccyx- meplex. On AVAPS overnight. Coarse crackles. ABX intermitted. Frequent productive cough, yonker used to collect thick yellow sputum. Increased SOB w/ WDL sats, PRN nebs given w/ no relief, House MD called Ativan given X1 w/ relief. Continue to monitor.

## 2019-04-18 NOTE — DISCHARGE SUMMARY
[facility]  Hospitalist Discharge Summary       Date of Admission:  4/17/2019  Date of Discharge:  4/18/2019  Discharging Provider: Wong Livingston MD      Discharge Diagnoses   1. Community acquired pneumonia, unknown pathogen  2. Acute hypoxic respiratory failure - improved  3. Chronic wounds - Lt posterior thigh PI, Stage 4, community acquired - [facility]  4. Diabetes mellitus type 2  5. Chronic anemia, multifactorial  6. Chronic CHF - no echo on record, unclear type  Multiple other issues as below     Follow-ups Needed After Discharge   Follow-up Appointments     Follow-up and recommended labs and tests       [facility] to [facility]           Unresulted Labs Ordered in the Past 30 Days of this Admission     No orders found for last 61 day(s).      These results will be followed up by NA    Discharge Disposition   Discharged to home  Condition at discharge: Stable    Hospital Course   Anton Crain is a 68-year-old gentleman with a complicated medical history including T6-T7 spinal cord injury causing complete paralysis of his lower extremity with additional complication of a neurogenic bladder and requiring a G-tube placement, as well as colostomy.  He has chronic wounds in his sacrum as well as behind his left thigh, for which he has a wound VAC.  He is also diabetic.  Admitted with what appears to be pneumonia, possibly community-acquired versus healthcare-acquired being admitted for further treatment with acute respiratory failure.        1.  Acute hypoxic respiratory failure with a retrocardiac infiltrate.  The patient has been on chronic suppressive antibiotic treatment with Augmentin and amoxicillin.  The patient was started on vancomycin, noting he has history of MRSA in his urine.  The patient was started on Zosyn in the setting of possible pseudomonas involvement as well.  As noted, the patient also has chronic wounds which he is on amoxicillin/Augmentin,  combination.  The patient will be on BiPAP/AVAPS support overnight.  He was intermittently able to tolerate room air prior to discharge[facility].  He was initially seen in clinic with saturations in the 80s. While admitted at [facility] he varied from room air to 10L nc or bipap. He was having difficulty with full sentences and was tachypneic at times.     2.  History of congestive heart failure.  The patient has small pleural effusion.  We will continue the patient on his diuretics with Bumex.  We will cautiously give him IV fluids, as long as he is n.p.o.     3.  Diabetes.  The patient will be on Lantus 47 units in the morning, moderate carbohydrate diet and Accu-Cheks 4 times a day with sliding scale insulin.     4.  Chronic malnutrition instead of uncomplicated multiple comorbidities.  The patient is on 3 cans of Impact nutritional supplementation, as well as ProSource.  We will have our dietitian see the patient and resume all of his supplements.     5.  Chronic wounds of his left thigh and sacrum.  We will have wound care nurse see the patient.  His wound VAC battery is running out as he was not planning on being admitted.  If this cannot be continued with respect to his wound VAC unit, then he would need to go to a wet dressing.  We will have our wound care nurse help assess that as well as the sacral wound.   - Discussed with [facility] MD who accepted the patient Dr. Alena Daly who is very familiar with his care; they relayed that his upcoming flap surgery for wounds would be postponed in light of the above infection, but that they now have open medical beds and would accept the patient. Anton was in agreement with transfer to the [facility] where he [facility].    6.  History of factor V Leiden heterozygous with a complication of DVT.  The patient will continue the warfarin with pharmacy dosing.     7.  Gastroesophageal reflux disease:  We will continue the patient on Prilosec.     8.  Peripheral  neuropathy.  We will keep the patient on gabapentin.     9.  Allergies:  We will continue the patient on cetirizine and Flonase.     10.  Anemia.  We will continue the patient on iron and vitamin C.     11.  Chronic left posterior thigh wound and sacral wound.  The patient is on combination of Augmentin as well as augmented dosing of additional amoxicillin.  We will hold that medication while the patient is on vancomycin and Zosyn.     12.  Hypertension.  We will continue the patient on his amlodipine.     13.  Deep venous thrombosis prophylaxis.  The patient is already anticoagulated.     Patient was discharged prior to staying 2 midnights as he was able to transfer to his usual location of care at the [facility].     Consultations This Hospital Stay   PHARMACY TO DOSE VANCO  PHARMACY TO DOSE WARFARIN  WOUND OSTOMY CONTINENCE NURSE  IP CONSULT  NUTRITION SERVICES ADULT IP CONSULT  NUTRITION SERVICES ADULT IP CONSULT  NUTRITION SERVICES ADULT IP CONSULT  PHARMACY IP CONSULT  CARE TRANSITION RN/SW IP CONSULT    Code Status   Full Code    Time Spent on this Encounter   IWong, personally saw the patient today and spent greater than 30 minutes discharging this patient.       Wong Livingston MD  [facility]  ______________________________________________________________________    Physical Exam   Vital Signs: Temp: 98.6  F (37  C) Temp src: Oral BP: 140/90 Pulse: 96 Heart Rate: 91 Resp: 25 SpO2: 95 % O2 Device: BiPAP/CPAP(p's bipap machine.bmunyan RT) Oxygen Delivery: 9 LPM  Weight: 329 lbs 0 oz    Gen: NAD, pleasant  HEENT: Normocephalic, EOMI, MMM  Resp: coarse bilaterally  CV: S1S2 heard, reg rhythm, reg rate  Abdo: soft, nontender, nondistended, bowel sounds present  Ext: calves nontender, well perfused  Neuro: AAOx3, CN grossly intact, no facial asymmetry, baseline hemiplegia below umbilicus unchanged         Primary Care Physician   Physician No Ref-Primary    Discharge Orders       Reason for your hospital stay    Cough, fever, and SOB     Follow-up and recommended labs and tests     [facility] to [facility]     Activity    Your activity upon discharge: activity as tolerated     Full Code     Diet    Follow this diet upon discharge: Orders Placed This Encounter      Snacks/Supplements Adult: Other; 2pm: strawberry Boost GC  (RD); Between Meals      Adult Formula Drip Feeding: Continuous Impact Peptide 1.5; Gastrostomy; Goal Rate: 75 mL; mL/hr; From: 8:00 PM; 6:00 AM; Medication - Feeding Tube Flush Frequency: At least 15-30 mL water before and after medication administration and with tube cl...      Moderate Consistent CHO Diet       Significant Results and Procedures   Most Recent 3 CBC's:  Recent Labs   Lab Test 04/17/19  0543   WBC 8.3   HGB 9.0*   MCV 73*        Most Recent 3 BMP's:  Recent Labs   Lab Test 04/17/19  0543      POTASSIUM 3.8   CHLORIDE 106   CO2 27   BUN 27   CR 0.51*   ANIONGAP 6   AIDAN 8.3*   *     Most Recent 3 INR's:  Recent Labs   Lab Test 04/18/19  0539 04/17/19  1511   INR 3.05* 3.02*     Most Recent 6 Bacteria Isolates From Any Culture (See EPIC Reports for Culture Details):No lab results found.  Most Recent Hemoglobin A1c:  Recent Labs   Lab Test 04/17/19  0543   A1C 7.4*     Most Recent Urinalysis:No lab results found.  Most Recent ABG:  Recent Labs   Lab Test 04/17/19  0325   PH 7.39   PO2 62*   PCO2 50*   HCO3 30*   ELZA 4.6   ,   Results for orders placed or performed during the hospital encounter of 04/17/19   XR Chest Port 1 View    Narrative    XR CHEST PORT 1 VW 4/17/2019 1:05 PM    COMPARISON: None.    HISTORY: Increased work of breathing and oxygen needs.      Impression    IMPRESSION: Enlarged cardiac silhouette. Likely small hiatal hernia.  Possible trace bilateral pleural effusions. No pneumothorax seen on  either side.    NNAMDI VIRK MD       Discharge Medications   Current Discharge Medication List      CONTINUE these medications which  have NOT CHANGED    Details   albuterol (PROAIR HFA/PROVENTIL HFA/VENTOLIN HFA) 108 (90 Base) MCG/ACT inhaler Inhale 2 puffs into the lungs every 4 hours as needed for shortness of breath / dyspnea or wheezing      !! albuterol (PROVENTIL) (2.5 MG/3ML) 0.083% neb solution Take 2.5 mg by nebulization 2 times daily      !! albuterol (PROVENTIL) (2.5 MG/3ML) 0.083% neb solution Take 2.5 mg by nebulization every 4 hours as needed for shortness of breath / dyspnea or wheezing      amLODIPine (NORVASC) 5 MG tablet Take 5 mg by mouth daily      bumetanide (BUMEX) 2 MG tablet Take 2 mg by mouth 2 times daily      cetirizine (ZYRTEC) 10 MG tablet Take 10 mg by mouth daily      docusate sodium (COLACE) 100 MG capsule Take 100 mg by mouth 2 times daily      ferrous sulfate (FEROSUL) 325 (65 Fe) MG tablet Take 325 mg by mouth daily (with breakfast)      fluticasone (FLONASE) 50 MCG/ACT nasal spray Spray 2 sprays into both nostrils At Bedtime      gabapentin (NEURONTIN) 300 MG capsule Take 900 mg by mouth 2 times daily      insulin glargine (LANTUS SOLOSTAR PEN) 100 UNIT/ML pen Inject 47 Units Subcutaneous every morning      omeprazole (PRILOSEC) 20 MG DR capsule Take 20 mg by mouth 2 times daily      polyethylene glycol (MIRALAX/GLYCOLAX) packet Take 1 packet by mouth daily      potassium chloride ER (K-TAB) 20 MEQ CR tablet Take 20 mEq by mouth 2 times daily      senna (SENOKOT) 8.6 MG tablet Take 2 tablets by mouth daily as needed for constipation      sodium chloride (OCEAN) 0.65 % nasal spray Spray 2 sprays into both nostrils 4 times daily as needed for congestion      vitamin C (ASCORBIC ACID) 250 MG TABS tablet Take 500 mg by mouth daily      vitamin D3 (CHOLECALCIFEROL) 1000 units (25 mcg) tablet Take 2,000 Units by mouth daily      WARFARIN SODIUM PO Take by mouth [facility].        !! - Potential duplicate medications found. Please discuss with provider.      STOP taking these medications        amoxicillin (AMOXIL) 500 MG capsule Comments:   Reason for Stopping:         amoxicillin-clavulanate (AUGMENTIN) 875-125 MG tablet Comments:   Reason for Stopping:         nitroGLYcerin (NITRO-BID) 2 % OINT ointment Comments:   Reason for Stopping:         tiotropium-olodaterol 2.5-2.5 MCG/ACT AERS Comments:   Reason for Stopping:             Allergies   Allergies   Allergen Reactions     Colchicine Cough     Terazosin Cough

## 2024-05-20 NOTE — PLAN OF CARE
VSS. Tele: SR. Denies pain. WOC changed wound dressings. IV vanco running. Continues to have thick yellow sputum, suction on and younker at bedside. Report called to CARLOS Logan RN. [facility] to [facility]  service. Ticket number [phone]. Rest of belongings were sent with pt via [facility].     No